# Patient Record
Sex: MALE | Race: WHITE | Employment: FULL TIME | URBAN - METROPOLITAN AREA
[De-identification: names, ages, dates, MRNs, and addresses within clinical notes are randomized per-mention and may not be internally consistent; named-entity substitution may affect disease eponyms.]

---

## 2017-01-23 DIAGNOSIS — G40.209 PARTIAL SYMPTOMATIC EPILEPSY WITH COMPLEX PARTIAL SEIZURES, NOT INTRACTABLE, WITHOUT STATUS EPILEPTICUS (HCC): ICD-10-CM

## 2017-01-23 RX ORDER — LEVETIRACETAM 750 MG/1
750 TABLET ORAL 2 TIMES DAILY
Qty: 60 TAB | Refills: 0 | Status: SHIPPED | OUTPATIENT
Start: 2017-01-23 | End: 2017-02-03 | Stop reason: SDUPTHER

## 2017-02-03 ENCOUNTER — OFFICE VISIT (OUTPATIENT)
Dept: NEUROLOGY | Age: 27
End: 2017-02-03

## 2017-02-03 VITALS
HEIGHT: 70 IN | WEIGHT: 245 LBS | BODY MASS INDEX: 35.07 KG/M2 | DIASTOLIC BLOOD PRESSURE: 70 MMHG | RESPIRATION RATE: 20 BRPM | SYSTOLIC BLOOD PRESSURE: 130 MMHG

## 2017-02-03 DIAGNOSIS — G40.209 PARTIAL SYMPTOMATIC EPILEPSY WITH COMPLEX PARTIAL SEIZURES, NOT INTRACTABLE, WITHOUT STATUS EPILEPTICUS (HCC): Primary | ICD-10-CM

## 2017-02-03 RX ORDER — LEVETIRACETAM 750 MG/1
750 TABLET ORAL 2 TIMES DAILY
Qty: 60 TAB | Refills: 10 | Status: SHIPPED | OUTPATIENT
Start: 2017-02-03 | End: 2017-02-08 | Stop reason: SDUPTHER

## 2017-02-03 NOTE — PROGRESS NOTES
Interval HPI:   This is a 27 y.o. male who is following up for focal epilepsy    Chief Complaint   Patient presents with    Seizure     Had EEG in 9/2014: normal awake and drowsy  No seizure since Feb 14th 2014    Continues on Keppra 750 mg BID, no side effects, no mood changes  No complaints, says just here for yearly follow up     Brief ROS: as above  There have been no significant changes in PMHx, PSHx, SHx except as noted above. No Known Allergies  Current Outpatient Prescriptions   Medication Sig Dispense Refill    levETIRAcetam (KEPPRA) 750 mg tablet Take 1 Tab by mouth two (2) times a day. For partial onset seizure disorder 60 Tab 10    sertraline (ZOLOFT) 100 mg tablet Take  by mouth daily.  pravastatin (PRAVACHOL) 20 mg tablet Take 20 mg by mouth nightly.  OTHER Taking cholesterol and anxiety meds but doesn't know names         Physical Exam  Blood pressure 130/70, resp. rate 20, height 5' 10\" (1.778 m), weight 111.1 kg (245 lb). No acute distress  CV: regular heart rate  Lungs: clear    Focused Neurological Exam     Mental status: Alert and oriented to person, place situation. Language: normal fluency and comprehension; no dysarthria. CNs:   Visual fields grossly normal  Extraocular movements intact, no nystagmus  Face appears symmetric and facial strength normal.    Hearing is intact to casual conversation. Sensory: intact light touch in all 4 extremities  Motor: Normal bulk and strength in all 4 extremities. Reflexes: DTRs are symmetric, 3+ patellars   Gait: normal    Impression      ICD-10-CM ICD-9-CM    1. Partial symptomatic epilepsy with complex partial seizures, not intractable, without status epilepticus ([facility] [address] G40.209 345.40 LEVETIRACETAM (KEPPRA)      levETIRAcetam (KEPPRA) 750 mg tablet      No seizure since Feb 2014. Well controlled on Keppra 750 mg BID; renewed Rx with 10 RF.   When he calls back for RF, will schedule his follow up visit (1 year from now).  Gave lab slip to get keppra level checked so we'll know at what level his seizures are controlled

## 2017-02-03 NOTE — MR AVS SNAPSHOT
Visit Information Date & Time Provider Department Dept. Phone Encounter #  
 2/3/2017 10:00 AM Hector Dooley MD [facility]rology [facility] [phone] [id number] Upcoming Health Maintenance Date Due DTaP/Tdap/Td series (1 - Tdap) 7/23/2009 INFLUENZA AGE 9 TO ADULT 8/1/2016 Allergies as of 2/3/2017  Review Complete On: 2/3/2017 By: Dipti Chavez LPN No Known Allergies Current Immunizations  Never Reviewed No immunizations on file. Not reviewed this visit You Were Diagnosed With   
  
 Codes Comments Partial symptomatic epilepsy with complex partial seizures, not intractable, without status epilepticus ([facility]    -  Primary ICD-10-CM: J56.549 ICD-9-CM: 345.40 Vitals BP Resp Height(growth percentile) Weight(growth percentile) BMI Smoking Status 130/70 20 5' 10\" (1.778 m) 245 lb (111.1 kg) 35.15 kg/m2 Never Smoker BMI and BSA Data Body Mass Index Body Surface Area  
 35.15 kg/m 2 2.34 m 2 [facility]erred Pharm[email or web address] [place] [facility] [phone] Your Updated Medication List  
  
   
This list is accurate as of: 2/3/17 10:18 AM.  Always use your most recent med list.  
  
  
  
  
 levETIRAcetam 750 mg tablet Commonly known as:  KEPPRA Take 1 Tab by mouth two (2) times a day. For partial onset seizure disorder OTHER Taking cholesterol and anxiety meds but doesn't know names  
  
 pravastatin 20 mg tablet Commonly known as:  PRAVACHOL Take 20 mg by mouth nightly. sertraline 100 mg tablet Commonly known as:  ZOLOFT Take  by mouth daily. Prescriptions Sent to Pharmacy Refills  
 levETIRAcetam (KEPPRA) 750 mg tablet 10 Sig: Take 1 Tab by mouth two (2) times a day. For partial onset seizure disorder Class: Normal  
 Pharmacy: [facility] [place] [address] #: [phone] Route: Oral  
  
We Performed the Following LEVETIRACETAM (KEPPRA) [id number] CPT(R)] Patient Instructions PRESCRIPTION REFILL POLICY [facility] Statement to Patients April 1, 2014 In an effort to ensure the large volume of patient prescription refills is processed in the most efficient and expeditious manner, we are asking our patients to assist us by calling your Pharmacy for all prescription refills, this will include also your  Mail Order Pharmacy. The pharmacy will contact our office electronically to continue the refill process. Please do not wait until the last minute to call your pharmacy. We need at least 48 hours (2days) to fill prescriptions. We also encourage you to call your pharmacy before going to  your prescription to make sure it is ready. With regard to controlled substance prescription refill requests (narcotic refills) that need to be picked up at our office, we ask your cooperation by providing us with at least 72 hours (3days) notice that you will need a refill. We will not refill narcotic prescription refill requests after 4:00pm on any weekday, Monday through Thursday, or after 2:00pm on Fridays, or on the weekends. We encourage everyone to explore another way of getting your prescription refill request processed using JCD, our patient web portal through our electronic medical record system. JCD is an efficient and effective way to communicate your medication request directly to the office and  downloadable as an whit on your smart phone . JCD also features a review functionality that allows you to view your medication list as well as leave messages for your physician. Are you ready to get connected? If so please review the attatched instructions or speak to any of our staff to get you set up right away! Thank you so much for your cooperation. Should you have any questions please contact our Practice Administrator. The Physicians and Staff,  [facility] [address] What is a living will? A living will is a legal form you use to write down the kind of care you want at the end of your life. It is used by the health professionals who will treat you if you aren't able to decide for yourself. If you put your wishes in writing, your loved ones and others will know what kind of care you want. They won't need to guess. This can ease your mind and be helpful to others. A living will is not the same as an estate or property will. An estate will explains what you want to happen with your money and property after you die. Is a living will a legal document? A living will is a legal document. Each state has its own laws about living murray. If you move to another state, make sure that your living will is legal in the state where you now live. Or you might use a universal form that has been approved by many states. This kind of form can sometimes be completed and stored online. Your electronic copy will then be available wherever you have a connection to the Internet. In most cases, doctors will respect your wishes even if you have a form from a different state. · You don't need an  to complete a living will. But legal advice can be helpful if your state's laws are unclear, your health history is complicated, or your family can't agree on what should be in your living will. · You can change your living will at any time. Some people find that their wishes about end-of-life care change as their health changes. · In addition to making a living will, think about completing a medical power of  form. This form lets you name the person you want to make end-of-life treatment decisions for you (your \"health care agent\") if you're not able to. Many hospitals and nursing homes will give you the forms you need to complete a living will and a medical power of . · Your living will is used only if you can't make or communicate decisions for yourself anymore. If you become able to make decisions again, you can accept or refuse any treatment, no matter what you wrote in your living will. · Your state may offer an online registry. This is a place where you can store your living will online so the doctors and nurses who need to treat you can find it right away. What should you think about when creating a living will? Talk about your end-of-life wishes with your family members and your doctor. Let them know what you want. That way the people making decisions for you won't be surprised by your choices. Think about these questions as you make your living will: · Do you know enough about life support methods that might be used? If not, talk to your doctor so you know what might be done if you can't breathe on your own, your heart stops, or you're unable to swallow. · What things would you still want to be able to do after you receive life-support methods? Would you want to be able to walk? To speak? To eat on your own? To live without the help of machines? · If you have a choice, where do you want to be cared for? In your home? At a hospital or nursing home? · Do you want certain Christianity practices performed if you become very ill? · If you have a choice at the end of your life, where would you prefer to die? At home? In a hospital or nursing home? Somewhere else? · Would you prefer to be buried or cremated? · Do you want your organs to be donated after you die? What should you do with your living will? · Make sure that your family members and your health care agent have copies of your living will. · Give your doctor a copy of your living will to keep in your medical record. If you have more than one doctor, make sure that each one has a copy. · You may want to put a copy of your living will where it can be easily found. Where can you learn more? Go to http://karen-ruben.info/. Enter Y015 in the search box to learn more about \"Learning About Living Robert Rosario. \" Current as of: February 24, 2016 Content Version: 11.1 © 5681-0131 [employer]. Care instructions adapted under license by [employer] (which disclaims liability or warranty for this information). If you have questions about a medical condition or this instruction, always ask your healthcare professional. [address] any warranty or liability for your use of this information. Advance Directives: Care Instructions Your Care Instructions An advance directive is a legal way to state your wishes at the end of your life. It tells your family and your doctor what to do if you can no longer say what you want. There are two main types of advance directives. You can change them any time that your wishes change. · A living will tells your family and your doctor your wishes about life support and other treatment. · A medical power of  lets you name a person to make treatment decisions for you when you can't speak for yourself. This person is called a health care agent. If you do not have an advance directive, decisions about your medical care may be made by a doctor or a  who doesn't know you. It may help to think of an advance directive as a gift to the people who care for you. If you have one, they won't have to make tough decisions by themselves. Follow-up care is a key part of your treatment and safety. Be sure to make and go to all appointments, and call your doctor if you are having problems. It's also a good idea to know your test results and keep a list of the medicines you take. How can you care for yourself at home? · Discuss your wishes with your loved ones and your doctor. This way, there are no surprises. · Many states have a unique form.  Or you might use a universal form that has been approved by many states. This kind of form can sometimes be completed and stored online. Your electronic copy will then be available wherever you have a connection to the Internet. In most cases, doctors will respect your wishes even if you have a form from a different state. · You don't need a  to do an advance directive. But you may want to get legal advice. · Think about these questions when you prepare an advance directive: ¨ Who do you want to make decisions about your medical care if you are not able to? Many people choose a family member, close friend, or doctor. ¨ Do you know enough about life support methods that might be used? If not, talk to your doctor so you understand. ¨ What are you most afraid of that might happen? You might be afraid of having pain, losing your independence, or being kept alive by machines. ¨ Where would you prefer to die? Choices include your home, a hospital, or a nursing home. ¨ Would you like to have information about hospice care to support you and your family? ¨ Do you want to donate organs when you die? ¨ Do you want certain Restorationism practices performed before you die? If so, put your wishes in the advance directive. · Read your advance directive every year, and make changes as needed. When should you call for help? Be sure to contact your doctor if you have any questions. Where can you learn more? Go to http://karen-ruben.info/. Enter R264 in the search box to learn more about \"Advance Directives: Care Instructions. \" Current as of: February 24, 2016 Content Version: 11.1 © 3030-7068 Healthwise, [employer]orporated. Care instructions adapted under license by [employer] (which disclaims liability or warranty for this information).  If you have questions about a medical condition or this instruction, always ask your healthcare professional. [address] any warranty or liability for your use of this information. Introducing [facility]! Dayana Gonzalez introduces Myla patient portal. Now you can access parts of your medical record, email your doctor's office, and request medication refills online. 1. In your internet browser, go to [email or web address] 2. Click on the First Time User? Click Here link in the Sign In box. You will see the New Member Sign Up page. 3. Enter your Myla Access Code exactly as it appears below. You will not need to use this code after youve completed the sign-up process. If you do not sign up before the expiration date, you must request a new code. · Myla Access Code: [email or web address] Expires: 5/4/2017 10:18 AM 
 
4. Enter the last four digits of your Social Security Number (xxxx) and Date of Birth (mm/dd/yyyy) as indicated and click Submit. You will be taken to the next sign-up page. 5. Create a Myla ID. This will be your Myla login ID and cannot be changed, so think of one that is secure and easy to remember. 6. Create a Myla password. You can change your password at any time. 7. Enter your Password Reset Question and Answer. This can be used at a later time if you forget your password. 8. Enter your e-mail address. You will receive e-mail notification when new information is available in [address] 9. Click Sign Up. You can now view and download portions of your medical record. 10. Click the Download Summary menu link to download a portable copy of your medical information. If you have questions, please visit the Frequently Asked Questions section of the Myla website. Remember, Myla is NOT to be used for urgent needs. For medical emergencies, dial 911. Now available from your iPhone and Android! Please provide this summary of care documentation to your next provider. Your primary care clinician is listed as Glen Box. [place].  If you have any questions after today's visit, please call [phone].

## 2017-02-03 NOTE — PATIENT INSTRUCTIONS
[facility]   Statement to Patients  April 1, 2014      In an effort to ensure the large volume of patient prescription refills is processed in the most efficient and expeditious manner, we are asking our patients to assist us by calling your Pharmacy for all prescription refills, this will include also your  Mail Order Pharmacy. The pharmacy will contact our office electronically to continue the refill process. Please do not wait until the last minute to call your pharmacy. We need at least 48 hours (2days) to fill prescriptions. We also encourage you to call your pharmacy before going to  your prescription to make sure it is ready. With regard to controlled substance prescription refill requests (narcotic refills) that need to be picked up at our office, we ask your cooperation by providing us with at least 72 hours (3days) notice that you will need a refill. We will not refill narcotic prescription refill requests after 4:00pm on any weekday, Monday through Thursday, or after 2:00pm on Fridays, or on the weekends. We encourage everyone to explore another way of getting your prescription refill request processed using Maozhao, our patient web portal through our electronic medical record system. Maozhao is an efficient and effective way to communicate your medication request directly to the office and  downloadable as an whit on your smart phone . Maozhao also features a review functionality that allows you to view your medication list as well as leave messages for your physician. Are you ready to get connected? If so please review the attatched instructions or speak to any of our staff to get you set up right away! Thank you so much for your cooperation. Should you have any questions please contact our Practice Administrator. The Physicians and Staff,  [facility] [address]  What is a living will?   A living will is a legal form you use to write down the kind of care you want at the end of your life. It is used by the health professionals who will treat you if you aren't able to decide for yourself. If you put your wishes in writing, your loved ones and others will know what kind of care you want. They won't need to guess. This can ease your mind and be helpful to others. A living will is not the same as an estate or property will. An estate will explains what you want to happen with your money and property after you die. Is a living will a legal document? A living will is a legal document. Each state has its own laws about living murray. If you move to another state, make sure that your living will is legal in the state where you now live. Or you might use a universal form that has been approved by many states. This kind of form can sometimes be completed and stored online. Your electronic copy will then be available wherever you have a connection to the Internet. In most cases, doctors will respect your wishes even if you have a form from a different state. · You don't need an  to complete a living will. But legal advice can be helpful if your state's laws are unclear, your health history is complicated, or your family can't agree on what should be in your living will. · You can change your living will at any time. Some people find that their wishes about end-of-life care change as their health changes. · In addition to making a living will, think about completing a medical power of  form. This form lets you name the person you want to make end-of-life treatment decisions for you (your \"health care agent\") if you're not able to. Many hospitals and nursing homes will give you the forms you need to complete a living will and a medical power of . · Your living will is used only if you can't make or communicate decisions for yourself anymore.  If you become able to make decisions again, you can accept or refuse any treatment, no matter what you wrote in your living will. · Your state may offer an online registry. This is a place where you can store your living will online so the doctors and nurses who need to treat you can find it right away. What should you think about when creating a living will? Talk about your end-of-life wishes with your family members and your doctor. Let them know what you want. That way the people making decisions for you won't be surprised by your choices. Think about these questions as you make your living will:  · Do you know enough about life support methods that might be used? If not, talk to your doctor so you know what might be done if you can't breathe on your own, your heart stops, or you're unable to swallow. · What things would you still want to be able to do after you receive life-support methods? Would you want to be able to walk? To speak? To eat on your own? To live without the help of machines? · If you have a choice, where do you want to be cared for? In your home? At a hospital or nursing home? · Do you want certain Pentecostalism practices performed if you become very ill? · If you have a choice at the end of your life, where would you prefer to die? At home? In a hospital or nursing home? Somewhere else? · Would you prefer to be buried or cremated? · Do you want your organs to be donated after you die? What should you do with your living will? · Make sure that your family members and your health care agent have copies of your living will. · Give your doctor a copy of your living will to keep in your medical record. If you have more than one doctor, make sure that each one has a copy. · You may want to put a copy of your living will where it can be easily found. Where can you learn more? Go to http://karen-ruben.info/. Enter Q805 in the search box to learn more about \"Learning About Living Justin. \"  Current as of: February 24, 2016  Content Version: 11.1  © 0066-4338 [employer]. Care instructions adapted under license by [employer] (which disclaims liability or warranty for this information). If you have questions about a medical condition or this instruction, always ask your healthcare professional. [facility] [address] any warranty or liability for your use of this information. Advance Directives: Care Instructions  Your Care Instructions  An advance directive is a legal way to state your wishes at the end of your life. It tells your family and your doctor what to do if you can no longer say what you want. There are two main types of advance directives. You can change them any time that your wishes change. · A living will tells your family and your doctor your wishes about life support and other treatment. · A medical power of  lets you name a person to make treatment decisions for you when you can't speak for yourself. This person is called a health care agent. If you do not have an advance directive, decisions about your medical care may be made by a doctor or a  who doesn't know you. It may help to think of an advance directive as a gift to the people who care for you. If you have one, they won't have to make tough decisions by themselves. Follow-up care is a key part of your treatment and safety. Be sure to make and go to all appointments, and call your doctor if you are having problems. It's also a good idea to know your test results and keep a list of the medicines you take. How can you care for yourself at home? · Discuss your wishes with your loved ones and your doctor. This way, there are no surprises. · Many states have a unique form. Or you might use a universal form that has been approved by many states. This kind of form can sometimes be completed and stored online.  Your electronic copy will then be available wherever you have a connection to the Internet. In most cases, doctors will respect your wishes even if you have a form from a different state. · You don't need a  to do an advance directive. But you may want to get legal advice. · Think about these questions when you prepare an advance directive:  ¨ Who do you want to make decisions about your medical care if you are not able to? Many people choose a family member, close friend, or doctor. ¨ Do you know enough about life support methods that might be used? If not, talk to your doctor so you understand. ¨ What are you most afraid of that might happen? You might be afraid of having pain, losing your independence, or being kept alive by machines. ¨ Where would you prefer to die? Choices include your home, a hospital, or a nursing home. ¨ Would you like to have information about hospice care to support you and your family? ¨ Do you want to donate organs when you die? ¨ Do you want certain Mandaen practices performed before you die? If so, put your wishes in the advance directive. · Read your advance directive every year, and make changes as needed. When should you call for help? Be sure to contact your doctor if you have any questions. Where can you learn more? Go to http://karen-ruben.info/. Enter R264 in the search box to learn more about \"Advance Directives: Care Instructions. \"  Current as of: February 24, 2016  Content Version: 11.1  © 0180-4208 [employer]. Care instructions adapted under license by [employer] (which disclaims liability or warranty for this information). If you have questions about a medical condition or this instruction, always ask your healthcare professional. [address] any warranty or liability for your use of this information.

## 2017-02-06 LAB — LEVETIRACETAM SERPL-MCNC: 8.6 UG/ML (ref 10–40)

## 2017-02-08 ENCOUNTER — TELEPHONE (OUTPATIENT)
Dept: NEUROLOGY | Age: 27
End: 2017-02-08

## 2017-02-08 DIAGNOSIS — G40.209 PARTIAL SYMPTOMATIC EPILEPSY WITH COMPLEX PARTIAL SEIZURES, NOT INTRACTABLE, WITHOUT STATUS EPILEPTICUS (HCC): Primary | ICD-10-CM

## 2017-02-08 RX ORDER — LEVETIRACETAM 1000 MG/1
1000 TABLET ORAL 2 TIMES DAILY
Qty: 60 TAB | Refills: 10 | Status: SHIPPED | OUTPATIENT
Start: 2017-02-08 | End: 2017-10-25 | Stop reason: SDUPTHER

## 2017-02-08 NOTE — PROGRESS NOTES
Let pt know keppra level was low, meaning he's either not taking it regularly, or if he's taking it twice a day as prescribed, then he'll need a higher dose to get him into the therapeutic range. Let me know what he says. Thanks.

## 2017-02-08 NOTE — TELEPHONE ENCOUNTER
Talked with patient he states he take his meds as prescribed   Please advise   Will call patient if new script is sent

## 2017-02-08 NOTE — TELEPHONE ENCOUNTER
----- Message from Chastity Sanchez MD sent at 2/8/2017 10:20 AM EST -----  Let pt know keppra level was low, meaning he's either not taking it regularly, or if he's taking it twice a day as prescribed, then he'll need a higher dose to get him into the therapeutic range. Let me know what he says. Thanks.

## 2017-02-08 NOTE — TELEPHONE ENCOUNTER
Let pt know that I'm going to increase the Keppra to 1000 mg twice a day. Printed new Rx/ he can . He can finish his current months Rx then switch over to the new Rx.

## 2017-10-25 DIAGNOSIS — G40.209 PARTIAL SYMPTOMATIC EPILEPSY WITH COMPLEX PARTIAL SEIZURES, NOT INTRACTABLE, WITHOUT STATUS EPILEPTICUS (HCC): ICD-10-CM

## 2017-10-25 DIAGNOSIS — G40.209 PARTIAL SYMPTOMATIC EPILEPSY WITH COMPLEX PARTIAL SEIZURES, NOT INTRACTABLE, WITHOUT STATUS EPILEPTICUS (HCC): Primary | ICD-10-CM

## 2017-10-25 RX ORDER — LEVETIRACETAM 1000 MG/1
1000 TABLET ORAL 2 TIMES DAILY
Qty: 180 TAB | Refills: 0 | Status: SHIPPED | OUTPATIENT
Start: 2017-10-25 | End: 2018-01-10 | Stop reason: SDUPTHER

## 2017-10-25 NOTE — TELEPHONE ENCOUNTER
Contacted patient. Scheduled him Wednesday, January 10, 2018 09:40 AM. Informed patient the lab slip will be mailed, and he can get it done 1-2 weeks before his visit.

## 2017-10-25 NOTE — TELEPHONE ENCOUNTER
Will send one 90 day Rx to last him till late Jan 2018 but will need to be seen in early-mid Jan 2018 for any further Rx (and that's roughly 1 year from last visit). He will also need to have Keppra level checked 1-2 weeks before that visit. Entered Keppra lab order as well.

## 2017-10-25 NOTE — TELEPHONE ENCOUNTER
----- Message from Lonney Cowden sent at 10/25/2017 10:40 AM EDT -----  Regarding: /Sabi Frey the pharmacist at [facility] p [phone], Requesting \"Levetiracetam 1000 mg Bid \" Rx , pt would like a 90 day supply quantity 180 tablets. Please give a call.

## 2018-01-05 LAB — LEVETIRACETAM SERPL-MCNC: 17.1 UG/ML (ref 10–40)

## 2018-01-10 ENCOUNTER — OFFICE VISIT (OUTPATIENT)
Dept: NEUROLOGY | Age: 28
End: 2018-01-10

## 2018-01-10 ENCOUNTER — DOCUMENTATION ONLY (OUTPATIENT)
Dept: NEUROLOGY | Age: 28
End: 2018-01-10

## 2018-01-10 VITALS
HEIGHT: 70 IN | WEIGHT: 254 LBS | HEART RATE: 100 BPM | OXYGEN SATURATION: 97 % | DIASTOLIC BLOOD PRESSURE: 88 MMHG | SYSTOLIC BLOOD PRESSURE: 128 MMHG | BODY MASS INDEX: 36.36 KG/M2

## 2018-01-10 DIAGNOSIS — G40.209 PARTIAL SYMPTOMATIC EPILEPSY WITH COMPLEX PARTIAL SEIZURES, NOT INTRACTABLE, WITHOUT STATUS EPILEPTICUS (HCC): Primary | ICD-10-CM

## 2018-01-10 RX ORDER — LEVETIRACETAM 1000 MG/1
1000 TABLET ORAL 2 TIMES DAILY
Qty: 180 TAB | Refills: 2 | Status: SHIPPED | OUTPATIENT
Start: 2018-01-10 | End: 2018-11-09 | Stop reason: SDUPTHER

## 2018-01-10 NOTE — MR AVS SNAPSHOT
Visit Information Date & Time Provider Department Dept. Phone Encounter #  
 1/10/2018  9:40 AM Silke Dyson MD [facility] [phone] [id number] Follow-up Instructions Return in about 1 year (around 1/10/2019). Upcoming Health Maintenance Date Due DTaP/Tdap/Td series (1 - Tdap) 7/23/2009 Influenza Age 5 to Adult 8/1/2017 Allergies as of 1/10/2018  Review Complete On: 1/10/2018 By: Marcellus Lima LPN No Known Allergies Current Immunizations  Never Reviewed No immunizations on file. Not reviewed this visit You Were Diagnosed With   
  
 Codes Comments Partial symptomatic epilepsy with complex partial seizures, not intractable, without status epilepticus [facility]    -  Primary ICD-10-CM: L91.338 ICD-9-CM: 345.40 Vitals BP Pulse Height(growth percentile) Weight(growth percentile) SpO2 BMI  
 128/88 (BP 1 Location: Left arm, BP Patient Position: Sitting) 100 5' 10\" (1.778 m) 254 lb (115.2 kg) 97% 36.45 kg/m2 Smoking Status Never Smoker Vitals History BMI and BSA Data Body Mass Index Body Surface Area  
 36.45 kg/m 2 2.39 m 2 Preferred Pharmacy Pharmacy Name Phone [facility] [address] [place] [address] [place] [facility] [phone] Your Updated Medication List  
  
   
This list is accurate as of: 1/10/18 10:11 AM.  Always use your most recent med list.  
  
  
  
  
 levETIRAcetam 1,000 mg tablet Take 1 Tab by mouth two (2) times a day for 90 days. For partial onset seizure disorder OTHER Taking cholesterol and anxiety meds but doesn't know names  
  
 pravastatin 20 mg tablet Commonly known as:  PRAVACHOL Take 20 mg by mouth nightly. sertraline 100 mg tablet Commonly known as:  ZOLOFT Take  by mouth daily. Prescriptions Sent to Pharmacy  Refills  
 levETIRAcetam 1,000 mg tablet 2  
 Sig: Take 1 Tab by mouth two (2) times a day for 90 days. For partial onset seizure disorder Class: Normal  
 Pharmacy: [facility] [address] [place] [address] #: [phone] Route: Oral  
  
Follow-up Instructions Return in about 1 year (around 1/10/2019). Introducing [facility] SERVICES! Cain Chavez introduces Mosaic patient portal. Now you can access parts of your medical record, email your doctor's office, and request medication refills online. 1. In your internet browser, go to [email or web address] 2. Click on the First Time User? Click Here link in the Sign In box. You will see the New Member Sign Up page. 3. Enter your Mosaic Access Code exactly as it appears below. You will not need to use this code after youve completed the sign-up process. If you do not sign up before the expiration date, you must request a new code. · Mosaic Access Code: [email or web address] Expires: 4/10/2018  9:40 AM 
 
4. Enter the last four digits of your Social Security Number (xxxx) and Date of Birth (mm/dd/yyyy) as indicated and click Submit. You will be taken to the next sign-up page. 5. Create a Mosaic ID. This will be your Mosaic login ID and cannot be changed, so think of one that is secure and easy to remember. 6. Create a Mosaic password. You can change your password at any time. 7. Enter your Password Reset Question and Answer. This can be used at a later time if you forget your password. 8. Enter your e-mail address. You will receive e-mail notification when new information is available in [address] 9. Click Sign Up. You can now view and download portions of your medical record. 10. Click the Download Summary menu link to download a portable copy of your medical information. If you have questions, please visit the Frequently Asked Questions section of the Mosaic website.  Remember, Mosaic is NOT to be used for urgent needs. For medical emergencies, dial 911. Now available from your iPhone and Android! Please provide this summary of care documentation to your next provider. Your primary care clinician is listed as Majo Yee. If you have any questions after today's visit, please call [phone].

## 2018-01-10 NOTE — PROGRESS NOTES
Interval HPI:   This is a 32 y.o. male who is following up for focal epilepsy    Chief Complaint   Patient presents with    Seizure     Had EEG in 9/2014: normal awake and drowsy    Last visit was Feb 2017. Keppra level after that visit was slightly low (8.7, rr 10-40), but pt reported taking it regularly. Increased it to 1000 mg BID. Denies any SEFx or mood changes. Pt says no seizure since Feb 14th 2014. Had Keppra level 1-3-18, was 17.1 (normal). No complaints today, says just here for yearly follow up and to have DMV forms filled out. Brief ROS: as above  There have been no significant changes in PMHx, PSHx, SHx except as noted above. No Known Allergies  Current Outpatient Prescriptions   Medication Sig Dispense Refill    levETIRAcetam 1,000 mg tablet Take 1 Tab by mouth two (2) times a day for 90 days. For partial onset seizure disorder 180 Tab 2    sertraline (ZOLOFT) 100 mg tablet Take  by mouth daily.  pravastatin (PRAVACHOL) 20 mg tablet Take 20 mg by mouth nightly.  OTHER Taking cholesterol and anxiety meds but doesn't know names         Physical Exam  Blood pressure 128/88, pulse 100, height 5' 10\" (1.778 m), weight 115.2 kg (254 lb), SpO2 97 %. No acute distress  Neck: supple  Exts: no edema    Focused Neurological Exam     Mental status: Alert and oriented to person, place situation. Language: normal fluency and comprehension; no dysarthria. CNs:   Visual fields grossly normal  Extraocular movements intact, no nystagmus  Face appears symmetric and facial strength normal.    Hearing is intact to casual conversation. Sensory: intact light touch in all 4 extremities  Motor: Normal bulk and strength in all 4 extremities. Reflexes: DTRs are symmetric, 3+ patellars   Gait: normal    Impression      ICD-10-CM ICD-9-CM    1.  Partial symptomatic epilepsy with complex partial seizures, not intractable, without status epilepticus ([facility] G40.209 345.40 levETIRAcetam 1,000 mg tablet      No seizure since Feb 2014. Well controlled on Keppra 1000 mg BID; Given 90 day Rx with 2 RF. When he calls back for RF, will schedule his follow up visit (1 year from now). Will fill out DMV forms.      Signed By: Vincenzo Noel MD     January 10, 2018

## 2019-01-14 ENCOUNTER — OFFICE VISIT (OUTPATIENT)
Dept: NEUROLOGY | Age: 29
End: 2019-01-14

## 2019-01-14 VITALS
RESPIRATION RATE: 20 BRPM | HEART RATE: 88 BPM | HEIGHT: 70 IN | OXYGEN SATURATION: 97 % | DIASTOLIC BLOOD PRESSURE: 84 MMHG | BODY MASS INDEX: 37.03 KG/M2 | SYSTOLIC BLOOD PRESSURE: 128 MMHG | WEIGHT: 258.7 LBS

## 2019-01-14 DIAGNOSIS — G40.209 PARTIAL SYMPTOMATIC EPILEPSY WITH COMPLEX PARTIAL SEIZURES, NOT INTRACTABLE, WITHOUT STATUS EPILEPTICUS (HCC): ICD-10-CM

## 2019-01-14 RX ORDER — MONTELUKAST SODIUM 10 MG/1
TABLET ORAL
Refills: 1 | COMMUNITY
Start: 2018-11-09 | End: 2020-12-22

## 2019-01-14 RX ORDER — LEVETIRACETAM 1000 MG/1
1000 TABLET ORAL 2 TIMES DAILY
Qty: 180 TAB | Refills: 2 | Status: SHIPPED | OUTPATIENT
Start: 2019-01-14 | End: 2019-04-14

## 2019-01-14 RX ORDER — ROSUVASTATIN CALCIUM 10 MG/1
TABLET, COATED ORAL
Refills: 1 | COMMUNITY
Start: 2018-12-14 | End: 2020-12-22

## 2019-01-14 RX ORDER — METFORMIN HYDROCHLORIDE 500 MG/1
TABLET, EXTENDED RELEASE ORAL
Refills: 1 | COMMUNITY
Start: 2018-12-14 | End: 2020-12-22

## 2019-01-14 NOTE — PROGRESS NOTES
Patient is here for a yearly follow up,    Patient states that he has been feeling good since the last visit. Patient states that he has not had any seizure activity since the last visit. No concerns for the patient.

## 2019-01-14 NOTE — PROGRESS NOTES
Interval HPI:   This is a 25 y.o. male who is following up for focal epilepsy    Chief Complaint   Patient presents with    Seizure     Follow up for seizures     Had EEG in 9/2014: normal awake and drowsy    Last visit was Jan 2018. Presents for annual follow up visit. Continues on Keppra 1000 mg BID for focal epilepsy. Pt says no seizure since Feb 14th 2014. Last Keppra level: 17.1 (normal) ([place] 3, 2018). Pt says DMV is allowing him to go up to 2 years between [facility] forms. Will have to get it done next year    Brief ROS: as above     No Known Allergies  Current Outpatient Medications   Medication Sig Dispense Refill    rosuvastatin (CRESTOR) 10 mg tablet TAKE 1 TABLET BY MOUTH EVERY DAY  1    montelukast (SINGULAIR) 10 mg tablet TAKE 1 TABLET BY MOUTH EVERY DAY  1    metFORMIN ER (GLUCOPHAGE XR) 500 mg tablet TAKE 1 TABLET BY MOUTH EVERY DAY WITH EVENING MEAL AS DIRECTED  1    levETIRAcetam 1,000 mg tablet Take 1 Tab by mouth two (2) times a day for 90 days. 180 Tab 2    sertraline (ZOLOFT) 100 mg tablet Take  by mouth daily.  pravastatin (PRAVACHOL) 20 mg tablet Take 20 mg by mouth nightly.  OTHER Taking cholesterol and anxiety meds but doesn't know names         Past Medical History:   Diagnosis Date    Depressed     High cholesterol     Seizures ([address]        No past surgical history on file. No family history on file.     Social History     Socioeconomic History    Marital status:      Spouse name: Not on file    Number of children: Not on file    Years of education: Not on file    Highest education level: Not on file   Social Needs    Financial resource strain: Not on file    Food insecurity - worry: Not on file    Food insecurity - inability: Not on file    Transportation needs - medical: Not on file   [employer] needs - non-medical: Not on file   Occupational History    Not on file   Tobacco Use    Smoking status: Never Smoker    Smokeless tobacco: Never Used   Substance and Sexual Activity    Alcohol use: No    Drug use: No    Sexual activity: Not on file   Other Topics Concern    Not on file   Social History Narrative    Not on file       Physical Exam  Blood pressure 128/84, pulse 88, resp. rate 20, height 5' 10\" (1.778 m), weight 117.3 kg (258 lb 11.2 oz), SpO2 97 %. No acute distress  Neck: supple  Exts: no edema    Focused Neurological Exam     Mental status: Alert and oriented to person, place situation. Language: normal fluency and comprehension; no dysarthria. CNs:   Visual fields grossly normal  Extraocular movements intact, no nystagmus  Face appears symmetric and facial strength normal.    Hearing is intact to casual conversation. Sensory: intact light touch in all 4 extremities  Motor: Normal bulk and strength in all 4 extremities. Reflexes: DTRs are symmetric, 3+ patellars   Gait: normal    Impression      ICD-10-CM ICD-9-CM    1. Partial symptomatic epilepsy with complex partial seizures, not intractable, without status epilepticus ([facility] G40.209 345.40 levETIRAcetam 1,000 mg tablet        No seizure since Feb 2014. Well controlled on Keppra 1000 mg BID; Given 90 day Rx with 2 RF. When he calls back for RF, will schedule his follow up visit (1 year from now). Pt didn't need to have DMV forms filled out his year.   Will see him back in 1 year      Signed By: Trena Tapia MD     January 14, 2019

## 2019-12-05 RX ORDER — LEVETIRACETAM 1000 MG/1
TABLET ORAL
Qty: 60 TAB | Refills: 0 | Status: SHIPPED | OUTPATIENT
Start: 2019-12-05 | End: 2020-01-10 | Stop reason: SDUPTHER

## 2020-01-10 ENCOUNTER — OFFICE VISIT (OUTPATIENT)
Dept: NEUROLOGY | Age: 30
End: 2020-01-10

## 2020-01-10 VITALS
BODY MASS INDEX: 35.65 KG/M2 | HEART RATE: 90 BPM | SYSTOLIC BLOOD PRESSURE: 120 MMHG | HEIGHT: 70 IN | DIASTOLIC BLOOD PRESSURE: 80 MMHG | WEIGHT: 249 LBS | OXYGEN SATURATION: 97 %

## 2020-01-10 DIAGNOSIS — G40.209 PARTIAL SYMPTOMATIC EPILEPSY WITH COMPLEX PARTIAL SEIZURES, NOT INTRACTABLE, WITHOUT STATUS EPILEPTICUS (HCC): Primary | ICD-10-CM

## 2020-01-10 DIAGNOSIS — G40.209 PARTIAL SYMPTOMATIC EPILEPSY WITH COMPLEX PARTIAL SEIZURES, NOT INTRACTABLE, WITHOUT STATUS EPILEPTICUS (HCC): ICD-10-CM

## 2020-01-10 RX ORDER — LEVETIRACETAM 1000 MG/1
1000 TABLET ORAL 2 TIMES DAILY
Qty: 60 TAB | Refills: 8 | Status: SHIPPED | OUTPATIENT
Start: 2020-01-10 | End: 2020-10-08

## 2020-01-10 NOTE — PROGRESS NOTES
Interval HPI:   This is a 30 y.o. male who is following up for focal epilepsy    Chief Complaint   Patient presents with    Seizure     EEG in 9/2014: normal awake and drowsy    Last visit was Jan 2019. Presents for annual follow up visit and to have DMV forms filled out. Continues on Keppra 1000 mg BID for focal epilepsy. Pt says no seizure since Feb 14th 2014. Last Keppra level: 17.1 (normal) ([place] 3, 2018). Brief ROS: as above     No Known Allergies     Current Outpatient Medications   Medication Sig Dispense Refill    levETIRAcetam 1,000 mg tablet Take 1 Tab by mouth two (2) times a day. Anti-seizure medication 60 Tab 8    rosuvastatin (CRESTOR) 10 mg tablet TAKE 1 TABLET BY MOUTH EVERY DAY  1    montelukast (SINGULAIR) 10 mg tablet TAKE 1 TABLET BY MOUTH EVERY DAY  1    metFORMIN ER (GLUCOPHAGE XR) 500 mg tablet TAKE 1 TABLET BY MOUTH EVERY DAY WITH EVENING MEAL AS DIRECTED  1    sertraline (ZOLOFT) 100 mg tablet Take  by mouth daily.  pravastatin (PRAVACHOL) 20 mg tablet Take 20 mg by mouth nightly.  OTHER Taking cholesterol and anxiety meds but doesn't know names       PMHx:   Past Medical History:   Diagnosis Date    Depressed     High cholesterol     Seizures ([facility]      PSHx:  has no past surgical history on file. SocHx:  reports that he has never smoked. He has never used smokeless tobacco. He reports that he does not drink alcohol or use drugs. FHx: family history is not on file. Physical Exam  Blood pressure 120/80, pulse 90, height 5' 10\" (1.778 m), weight 112.9 kg (249 lb), SpO2 97 %. No acute distress  Neck: supple  Exts: no edema    Focused Neurological Exam     Mental status: Alert and oriented to person, place situation. Language: normal fluency and comprehension; no dysarthria.       CNs:   Visual fields grossly normal  Extraocular movements intact, no nystagmus  Face appears symmetric and facial strength normal.    Hearing is intact to casual conversation. Sensory: intact light touch in all 4 extremities  Motor: Normal bulk and strength in all 4 extremities. Reflexes: not tested  Gait: normal    Impression      ICD-10-CM ICD-9-CM    1. Partial symptomatic epilepsy with complex partial seizures, not intractable, without status epilepticus ([facility] G40.209 345.40 LEVETIRACETAM (KEPPRA)      levETIRAcetam 1,000 mg tablet        No seizure since Feb 2014.   Well controlled on Keppra 1000 mg BID  Sent Rx to local pharmacy (pt prefers 30 day Rx with RFs)  Given lab slip to have keppra level checked  Will complete DMV form after keppra level resulted      Signed By: Rosette Nuñez MD     January 10, 2020

## 2020-01-14 ENCOUNTER — TELEPHONE (OUTPATIENT)
Dept: NEUROLOGY | Age: 30
End: 2020-01-14

## 2020-01-14 DIAGNOSIS — G40.209 PARTIAL SYMPTOMATIC EPILEPSY WITH COMPLEX PARTIAL SEIZURES, NOT INTRACTABLE, WITHOUT STATUS EPILEPTICUS (HCC): Primary | ICD-10-CM

## 2020-01-14 LAB — LEVETIRACETAM SERPL-MCNC: 10.2 UG/ML (ref 10–40)

## 2020-01-14 NOTE — TELEPHONE ENCOUNTER
Attempted to contact patient. Left message on voicemail for return call. Will mail letter and lab slip to patient     ----- Message from Page Bryant MD sent at 1/14/2020  4:46 PM EST -----  Let patient know Dina Naranjo level is barely within the therapeutic range. Normal range is 10.0 - 20.0, and his is only 10.2. At last check (1-13-18) Keppra level was 17.1. Correction.  Patient taking Keppra 1000 mg tablet twice a day.  He should increase the morning dose to 1.5 tablets and keep the evening dose at 1 tablet. .  Once it's more within the therapeutic range, I'll sign the DMV form.

## 2020-01-14 NOTE — LETTER
1/14/2020 5:15 PM 
 
Mr. Em Mckenna [id number] [address] Dear Em Mckenna: 
 
Please find your most recent results below. Your Keppra level in barely within the therapeutic range. Please increase your medication as recommended below and we will recheck labs 2 weeks after the increase. I will sign DMV forms when level is higher within the therapeutic range. Resulted Orders LEVETIRACETAM (KEPPRA) Result Value Ref Range LEVETIRACETAM, S 10.2 10.0 - 40.0 ug/mL RECOMMENDATIONS: 
Increase Keppra 1000mg to 1.5 tablets in the morning and 1 tablet in the evening. Please call me if you have any questions: [phone] Sincerely, Nneka Dumont MD

## 2020-01-14 NOTE — PROGRESS NOTES
Let patient know Keppra level is barely within the therapeutic range. Normal range is 10.0 - 20.0, and his is only 10.2. At last check (1-13-18) Keppra level was 17.1. Will need to increase Keppra to 1250 mg (2.5 tabs) in AM and 1250 mg (2.5 tabs in PM) and have level rechecked 2 weeks after that. Once it's more within the therapeutic range, I'll sign the DMV form.

## 2020-01-14 NOTE — PROGRESS NOTES
Correction. Patient taking Keppra 1000 mg tablet twice a day. He should increase the morning dose to 1.5 tablets and keep the evening dose at 1 tablet.

## 2020-01-15 ENCOUNTER — TELEPHONE (OUTPATIENT)
Dept: NEUROLOGY | Age: 30
End: 2020-01-15

## 2020-01-15 NOTE — TELEPHONE ENCOUNTER
R/t call to patient. Informed him of medication change and lab level. Mailed lab form patient states understanding. Per Dr. Gabrielle Albrecht:  Let patient know [address] level is barely within the therapeutic range. Normal range is 10.0 - 20.0, and his is only 10.2. At last check (1-13-18) Keppra level was 17. 1.         Correction.  Patient taking Keppra 1000 mg tablet twice a day.  He should increase the morning dose to 1.5 tablets and keep the evening dose at 1 tablet.

## 2020-01-30 LAB — LEVETIRACETAM SERPL-MCNC: 23.2 UG/ML (ref 10–40)

## 2020-02-04 ENCOUNTER — TELEPHONE (OUTPATIENT)
Dept: NEUROLOGY | Age: 30
End: 2020-02-04

## 2020-02-04 NOTE — TELEPHONE ENCOUNTER
----- Message from Lucas Farah sent at 2/3/2020  3:25 PM EST -----  Regarding: Dr Zainab Tovar Message/Vendor Calls    Caller's first and last name:      Reason for call: pt is reporting that he had lab work re-done last week to check [place] levels to be reported to the [facility].  Pt is inquiring of the status of the testing      Callback required yes/no and why: yes      Best contact number(s):[phone]      Details to clarify the request:      Lucas Farah

## 2020-02-05 NOTE — TELEPHONE ENCOUNTER
Let pt know his repeat keppra level was 23.2, more within the normal range than last check. Continue taking Keppra as instructed. I will complete his DMV form and send it in.

## 2020-10-07 DIAGNOSIS — G40.209 PARTIAL SYMPTOMATIC EPILEPSY WITH COMPLEX PARTIAL SEIZURES, NOT INTRACTABLE, WITHOUT STATUS EPILEPTICUS (HCC): Primary | ICD-10-CM

## 2020-12-14 ENCOUNTER — TELEPHONE (OUTPATIENT)
Dept: NEUROLOGY | Age: 30
End: 2020-12-14

## 2020-12-14 DIAGNOSIS — G40.209 PARTIAL SYMPTOMATIC EPILEPSY WITH COMPLEX PARTIAL SEIZURES, NOT INTRACTABLE, WITHOUT STATUS EPILEPTICUS (HCC): ICD-10-CM

## 2020-12-14 RX ORDER — LEVETIRACETAM 1000 MG/1
TABLET ORAL
Qty: 60 TAB | Refills: 1 | Status: SHIPPED | OUTPATIENT
Start: 2020-12-14 | End: 2020-12-22 | Stop reason: SDUPTHER

## 2020-12-14 NOTE — TELEPHONE ENCOUNTER
----- Message from Bryanna Bolaños sent at 12/14/2020  1:19 PM EST -----  Regarding: Dr. Sherley Dejesus Message/Vendor Calls    Caller's first and last name: Patient      Reason for call: patient will be out of his medication Levetiracetam before his appt he would need a 6 day supply       Callback required yes/no and why: yes      Best contact number(s):[phone]      Details to clarify the request:      Bryanna Bolaños

## 2020-12-18 LAB — LEVETIRACETAM SERPL-MCNC: 10.1 UG/ML (ref 10–40)

## 2020-12-22 ENCOUNTER — OFFICE VISIT (OUTPATIENT)
Dept: NEUROLOGY | Age: 30
End: 2020-12-22
Payer: COMMERCIAL

## 2020-12-22 VITALS
DIASTOLIC BLOOD PRESSURE: 70 MMHG | OXYGEN SATURATION: 96 % | SYSTOLIC BLOOD PRESSURE: 110 MMHG | TEMPERATURE: 96.8 F | HEART RATE: 102 BPM

## 2020-12-22 DIAGNOSIS — G40.209 PARTIAL SYMPTOMATIC EPILEPSY WITH COMPLEX PARTIAL SEIZURES, NOT INTRACTABLE, WITHOUT STATUS EPILEPTICUS (HCC): Primary | ICD-10-CM

## 2020-12-22 PROCEDURE — 99214 OFFICE O/P EST MOD 30 MIN: CPT | Performed by: PSYCHIATRY & NEUROLOGY

## 2020-12-22 RX ORDER — LEVETIRACETAM 1000 MG/1
1000 TABLET ORAL 2 TIMES DAILY
Qty: 180 TAB | Refills: 0 | Status: SHIPPED | OUTPATIENT
Start: 2020-12-22 | End: 2021-03-22

## 2020-12-22 NOTE — PATIENT INSTRUCTIONS
Check with your pharmacist (and look on [employer]) to see if Keppra XR (extended release version, once daily) 2000 mg a day, is cheap/ affordable (generic version is okay). If so, let me know and I can send you in a 90 day prescription (you'd have to tell me which pharmacy). This is to make it easier for you to remember to take the medication, so it stays within the therapeutic range in the blood.

## 2020-12-22 NOTE — PROGRESS NOTES
Interval HPI:   This is a 26 y.o. male who is following up for focal epilepsy    Chief Complaint   Patient presents with    Seizure     EEG in 9/2014: normal awake and drowsy    Presents for annual seizure follow up. Last visit was in Jan 2020. Pt says no seizure since Feb 14th 2014. He reports continuing on Keppra 1000 mg twice a day for partial epilepsy. Had recent keppra level drawn: 10.1, barely within therapeutic range. Asked if he has added any new medications or is skipping any doses. He says no new meds, and he rarely misses a dose. D/w him that if he's having a hard time remembering to take the medication twice a day, there is a once a day formulation that might make it easier for him. He is agreeable to checking with his Pharmacy whether this is affordable for him, and then he will let me know. For now, we will keep his Keppra at 1000 mg tablet one tab twice a day. Regarding DMV forms, pt says DMV has not sent him a form this year to have filled out. Possibly because last seizure was 5+ yrs ago. Keppra levels (therapeutic range 10-40): 2-3-17 (8.6), 1-3-18 (17.1), 1-10-20 (10.2), 1-28-20 (23.2), 12- (10.1)    Brief ROS: as above     No Known Allergies     Current Outpatient Medications   Medication Sig Dispense Refill    levETIRAcetam 1,000 mg tablet Take 1 Tab by mouth two (2) times a day for 90 days. Anti-seizure medication 180 Tab 0    sertraline (ZOLOFT) 100 mg tablet Take  by mouth daily. PMHx:   Past Medical History:   Diagnosis Date    Depressed     High cholesterol     Seizures ([facility] [address]      PSHx:  has no past surgical history on file. SocHx:  reports that he has never smoked. He has never used smokeless tobacco. He reports that he does not drink alcohol or use drugs. FHx: family history is not on file. Physical Exam  Blood pressure 110/70, pulse (!) 102, temperature 96.8 °F (36 °C), SpO2 96 %.     No acute distress  Neck: supple  Exts: no edema    Focused Neurological Exam     Mental status: Alert and oriented to person, place situation. Language: normal fluency and comprehension; no dysarthria. CNs:   Visual fields grossly normal  Extraocular movements intact, no nystagmus  Face appears symmetric and facial strength normal.    Hearing is intact to casual conversation. Sensory: intact light touch in all 4 extremities  Motor: Normal bulk and strength in all 4 extremities. Reflexes: 2+ patellars, symmetric  Gait: normal    Impression      ICD-10-CM ICD-9-CM    1. Partial symptomatic epilepsy with complex partial seizures, not intractable, without status epilepticus ([facility] [address]  G40.209 345.40 levETIRAcetam 1,000 mg tablet        No seizure since Feb 2014. Seizures controlled since being on Keppra 1000 mg BID. Pt to ask Pharmacy what cost of Keppra XR (or generic equivalent) 2000 mg once a day would be. If affordable, aptient will let me know and I'll send in 90 day Rx of that. For now, continue Keppra 1000 mg BID (sent 90 day Rx).     Follow up in 1 yr         Signed By: Dora Roman MD     December 22, 2020

## 2021-05-17 ENCOUNTER — TELEPHONE (OUTPATIENT)
Dept: NEUROLOGY | Age: 31
End: 2021-05-17

## 2021-05-17 DIAGNOSIS — G40.209 PARTIAL SYMPTOMATIC EPILEPSY WITH COMPLEX PARTIAL SEIZURES, NOT INTRACTABLE, WITHOUT STATUS EPILEPTICUS (HCC): Primary | ICD-10-CM

## 2021-05-17 RX ORDER — LEVETIRACETAM 1000 MG/1
1000 TABLET ORAL 2 TIMES DAILY
Qty: 180 TAB | Refills: 1 | Status: SHIPPED | OUTPATIENT
Start: 2021-05-17 | End: 2021-08-15

## 2021-05-17 NOTE — TELEPHONE ENCOUNTER
----- Message from Olivia Cisneros sent at 5/17/2021 12:26 PM EDT -----  Regarding: Dr. Serafin Montero (if not patient): Pt      Relationship of caller (if not patient): Self      Best contact number(s): [phone]      Name of medication and dosage if known: levETIRAcetam 1,000 mg tablet       Is patient out of this medication (yes/no): Yes      Pharmacy name: [address] listed in chart? (yes/no): Yes  Pharmacy phone number: [phone]      Details to clarify the request: Pt is requesting a refill of the above medication.       Olivia Cisneros

## 2022-01-04 ENCOUNTER — OFFICE VISIT (OUTPATIENT)
Dept: NEUROLOGY | Age: 32
End: 2022-01-04
Payer: COMMERCIAL

## 2022-01-04 VITALS
SYSTOLIC BLOOD PRESSURE: 126 MMHG | HEIGHT: 70 IN | HEART RATE: 100 BPM | RESPIRATION RATE: 16 BRPM | BODY MASS INDEX: 35.65 KG/M2 | WEIGHT: 249 LBS | DIASTOLIC BLOOD PRESSURE: 76 MMHG

## 2022-01-04 DIAGNOSIS — G40.209 PARTIAL SYMPTOMATIC EPILEPSY WITH COMPLEX PARTIAL SEIZURES, NOT INTRACTABLE, WITHOUT STATUS EPILEPTICUS (HCC): Primary | ICD-10-CM

## 2022-01-04 PROCEDURE — 99214 OFFICE O/P EST MOD 30 MIN: CPT | Performed by: PSYCHIATRY & NEUROLOGY

## 2022-01-04 RX ORDER — METFORMIN HYDROCHLORIDE 1000 MG/1
TABLET ORAL
COMMUNITY

## 2022-01-04 RX ORDER — LEVETIRACETAM 1000 MG/1
1000 TABLET ORAL 2 TIMES DAILY
COMMUNITY
End: 2022-01-04

## 2022-01-04 RX ORDER — LEVETIRACETAM 1000 MG/1
1000 TABLET ORAL 2 TIMES DAILY
COMMUNITY
Start: 2021-12-14 | End: 2022-01-04 | Stop reason: SDUPTHER

## 2022-01-04 RX ORDER — FENOFIBRATE 145 MG/1
TABLET, COATED ORAL
COMMUNITY

## 2022-01-04 RX ORDER — ROSUVASTATIN CALCIUM 10 MG/1
TABLET, COATED ORAL
COMMUNITY
End: 2022-01-04

## 2022-01-04 RX ORDER — LEVETIRACETAM 1000 MG/1
1000 TABLET ORAL 2 TIMES DAILY
Qty: 180 TABLET | Refills: 2 | Status: SHIPPED | OUTPATIENT
Start: 2022-01-04 | End: 2022-04-04

## 2022-01-04 RX ORDER — ROSUVASTATIN CALCIUM 20 MG/1
TABLET, COATED ORAL
COMMUNITY

## 2022-01-04 NOTE — PROGRESS NOTES
Chief Complaint   Patient presents with    Seizure     follow up for annual DMV forms, no seizure activity     Visit Vitals  /76 (BP 1 Location: Left upper arm, BP Patient Position: Sitting)   Pulse 100   Resp 16   Ht 5' 10\" (1.778 m)   Wt 112.9 kg (249 lb)   BMI 35.73 kg/m²

## 2022-01-04 NOTE — LETTER
1/4/2022    Patient: Johann Mathews   YOB: 1990   Date of Visit: 1/4/2022     Eloise Lopez MD  Via Outside Provider Messaging    Dear Eloise Lopez MD,      Thank you for referring Mr. Johann Mathews to [address] [place] [address] for evaluation. My notes for this consultation are attached. If you have questions, please do not hesitate to call me. I look forward to following your patient along with you.       Sincerely,    Jesse Graham MD

## 2022-01-04 NOTE — PROGRESS NOTES
Dell Campbell (1990) is a 33 y.o. male, established patient, here for evaluation of the following     Chief complaint(s):   Chief Complaint   Patient presents with    Seizure     follow up for annual DMV forms, no seizure activity       SUBJECTIVE/ OBJECTIVE:    HPI: 33 y.o. male      Presents for annual seizure follow up. Last visit was in Dec 2020. Denies any seizure since the 2-    Continues on Keppra 1000 mg twice a day for partial epilepsy    No recent Keppra level (last done in Dec 2020)    Denies any SEFx from [facility] levels (therapeutic range 10-40): 2-3-17 (8.6), 1-3-18 (17.1), 1-10-20 (10.2), 1-28-20 (23.2), 12- (10.1)      ========================================    Brief Hx:        EEG in 9/2014: normal awake and drowsy      No Known Allergies      Current Outpatient Medications:     rosuvastatin (CRESTOR) 20 mg tablet, rosuvastatin, Disp: , Rfl:     fenofibrate nanocrystallized (TRICOR) 145 mg tablet, fenofibrate nanocrystallized 145 mg tablet, Disp: , Rfl:     levETIRAcetam 1,000 mg tablet, Take 1 Tablet by mouth two (2) times a day for 90 days. Anti-seizure medication. , Disp: 180 Tablet, Rfl: 2    metFORMIN (GLUCOPHAGE) 1,000 mg tablet, metformin (Patient not taking: Reported on 1/4/2022), Disp: , Rfl:     sertraline (ZOLOFT) 100 mg tablet, Take  by mouth daily. , Disp: , Rfl:      has a past medical history of Depressed, High cholesterol, and Seizures ([place] [address] has no past surgical history on file. Physical Exam:    Vitals:    01/04/22 1517   BP: 126/76   BP 1 Location: Left upper arm   BP Patient Position: Sitting   Pulse: 100   Resp: 16   Height: 5' 10\" (1.778 m)   Weight: 112.9 kg (249 lb)     Awake, alert, conversant  EOMI  Hearing/ speech normal   Visual fields normal  Ambulates without difficulty      ========================================    ASSESSMENT/ PLAN:       ICD-10-CM ICD-9-CM    1.  Partial symptomatic epilepsy with complex partial seizures, not intractable, without status epilepticus ([facility] [address]  G40.209 345.40 LEVETIRACETAM (KEPPRA)      LEVETIRACETAM (KEPPRA)      levETIRAcetam 1,000 mg tablet      Given order to have keppra level drawn at [facility] [address] out most of DMV form    When keppra level results, will put that in [facility] form and fax off to [facility] and nurse will mail him a copy    Sent 90 day Rx keppra + RFs    Follow up in 1 yr      An electronic signature was used to authenticate this note.   -- Jp Mary MD

## 2022-01-24 LAB — LEVETIRACETAM SERPL-MCNC: 14.4 UG/ML (ref 10–40)

## 2022-02-04 ENCOUNTER — TELEPHONE (OUTPATIENT)
Dept: NEUROLOGY | Age: 32
End: 2022-02-04

## 2023-02-10 ENCOUNTER — OFFICE VISIT (OUTPATIENT)
Dept: NEUROLOGY | Age: 33
End: 2023-02-10
Payer: COMMERCIAL

## 2023-02-10 VITALS
SYSTOLIC BLOOD PRESSURE: 138 MMHG | HEART RATE: 82 BPM | DIASTOLIC BLOOD PRESSURE: 84 MMHG | RESPIRATION RATE: 16 BRPM | TEMPERATURE: 97.4 F | OXYGEN SATURATION: 100 %

## 2023-02-10 DIAGNOSIS — G40.209 PARTIAL SYMPTOMATIC EPILEPSY WITH COMPLEX PARTIAL SEIZURES, NOT INTRACTABLE, WITHOUT STATUS EPILEPTICUS (HCC): Primary | ICD-10-CM

## 2023-02-10 PROCEDURE — 99214 OFFICE O/P EST MOD 30 MIN: CPT | Performed by: PSYCHIATRY & NEUROLOGY

## 2023-02-10 RX ORDER — LEVETIRACETAM 1000 MG/1
1000 TABLET ORAL 2 TIMES DAILY
COMMUNITY
Start: 2022-12-23 | End: 2023-02-10 | Stop reason: SDUPTHER

## 2023-02-10 RX ORDER — LEVETIRACETAM 1000 MG/1
1000 TABLET ORAL 2 TIMES DAILY
Qty: 180 TABLET | Refills: 2 | Status: SHIPPED | OUTPATIENT
Start: 2023-02-10 | End: 2023-05-11

## 2023-02-10 NOTE — LETTER
3/20/2023    Patient: Marcos Tabor   YOB: 1990   Date of Visit: 2/10/2023     Denton Javier MD  [address]  [address] [address]  [facility]  Via Fax: [phone]    Dear Denton Javier MD,      Thank you for referring Mr. Marcos Tabor to [facility] for evaluation. My notes for this consultation are attached. If you have questions, please do not hesitate to call me. I look forward to following your patient along with you.       Sincerely,    Robert Arroyo MD

## 2023-02-10 NOTE — PROGRESS NOTES
Chief Complaint   Patient presents with    Seizure     Patient states no recent seizure activity, stable on medication.

## 2023-02-10 NOTE — PROGRESS NOTES
Brian Huynh (1990) is a 27 y.o. male, established patient, here for evaluation of the following     Chief complaint(s):   Chief Complaint   Patient presents with    Seizure     Patient states no recent seizure activity, stable on medication. SUBJECTIVE/ OBJECTIVE:    HPI: 27 y.o. male      Presents for annual seizure follow up. Last visit was in 1-4-2022      Denies any seizure since the 2-    Continues on Keppra 1000 mg twice a day for partial epilepsy    No recent Keppra level (last done in Dec 2020)    Denies any SEFx from [place]    [facility] go 2 yrs (2024 next) between Med Forms       Keppra levels (therapeutic range 10-40): 2-3-17 (8.6), 1-3-18 (17.1), 1-10-20 (10.2), 1-28-20 (23.2), 12- (10.1)    ========================================    Brief Hx:      EEG in 9/2014: normal awake and drowsy      No Known Allergies      Current Outpatient Medications:     levETIRAcetam 1,000 mg tablet, Take 1 Tablet by mouth two (2) times a day for 90 days. , Disp: 180 Tablet, Rfl: 2    rosuvastatin (CRESTOR) 20 mg tablet, rosuvastatin, Disp: , Rfl:     metFORMIN (GLUCOPHAGE) 1,000 mg tablet, metformin, Disp: , Rfl:     fenofibrate nanocrystallized (TRICOR) 145 mg tablet, fenofibrate nanocrystallized 145 mg tablet, Disp: , Rfl:     sertraline (ZOLOFT) 100 mg tablet, Take  by mouth daily. , Disp: , Rfl:      has a past medical history of Depressed, High cholesterol, and Seizures ([facility] [address] has no past surgical history on file. Physical Exam:    Vitals:    02/10/23 0858   BP: 138/84   Pulse: 82   Temp: 97.4 °F (36.3 °C)   Resp: 16   SpO2: 100%       Awake, alert, conversant  EOMI  Hearing/ speech normal   Visual fields normal  Ambulates without difficulty      ========================================    ASSESSMENT/ PLAN:       ICD-10-CM ICD-9-CM    1.  Partial symptomatic epilepsy with complex partial seizures, not intractable, without [facility]ptic[facility] G40.209 345.40 LEVETIRACETAM (KEPPRA)      levETIRAcetam 1,000 mg tablet        Continue Keppra 1000 mg twice a day    Sent 90 day Rx + 2 RFs    Gave order to have keppra level drawn at [facility] lab in next building    Follow up in 1 yr, sooner if needed        An electronic signature was used to authenticate this note.   -- Greg Peralta MD